# Patient Record
Sex: MALE | Race: WHITE | NOT HISPANIC OR LATINO | Employment: OTHER | URBAN - METROPOLITAN AREA
[De-identification: names, ages, dates, MRNs, and addresses within clinical notes are randomized per-mention and may not be internally consistent; named-entity substitution may affect disease eponyms.]

---

## 2017-04-10 ENCOUNTER — OFFICE VISIT (OUTPATIENT)
Dept: URGENT CARE | Facility: CLINIC | Age: 77
End: 2017-04-10
Payer: MEDICARE

## 2017-04-10 ENCOUNTER — APPOINTMENT (OUTPATIENT)
Dept: RADIOLOGY | Facility: IMAGING CENTER | Age: 77
End: 2017-04-10
Attending: NURSE PRACTITIONER
Payer: MEDICARE

## 2017-04-10 VITALS
DIASTOLIC BLOOD PRESSURE: 85 MMHG | OXYGEN SATURATION: 97 % | WEIGHT: 195 LBS | SYSTOLIC BLOOD PRESSURE: 130 MMHG | BODY MASS INDEX: 26.44 KG/M2 | RESPIRATION RATE: 20 BRPM | HEART RATE: 80 BPM | TEMPERATURE: 97.9 F

## 2017-04-10 DIAGNOSIS — R05.8 PRODUCTIVE COUGH: ICD-10-CM

## 2017-04-10 DIAGNOSIS — J22 ACUTE LOWER RESPIRATORY TRACT INFECTION: ICD-10-CM

## 2017-04-10 PROCEDURE — G8432 DEP SCR NOT DOC, RNG: HCPCS | Performed by: NURSE PRACTITIONER

## 2017-04-10 PROCEDURE — G8419 CALC BMI OUT NRM PARAM NOF/U: HCPCS | Performed by: NURSE PRACTITIONER

## 2017-04-10 PROCEDURE — 1101F PT FALLS ASSESS-DOCD LE1/YR: CPT | Mod: 8P | Performed by: NURSE PRACTITIONER

## 2017-04-10 PROCEDURE — 1036F TOBACCO NON-USER: CPT | Performed by: NURSE PRACTITIONER

## 2017-04-10 PROCEDURE — 71020 DX-CHEST-2 VIEWS: CPT | Mod: TC | Performed by: NURSE PRACTITIONER

## 2017-04-10 PROCEDURE — 99214 OFFICE O/P EST MOD 30 MIN: CPT | Performed by: NURSE PRACTITIONER

## 2017-04-10 PROCEDURE — 3017F COLORECTAL CA SCREEN DOC REV: CPT | Mod: 8P | Performed by: NURSE PRACTITIONER

## 2017-04-10 PROCEDURE — 4040F PNEUMOC VAC/ADMIN/RCVD: CPT | Mod: 8P | Performed by: NURSE PRACTITIONER

## 2017-04-10 RX ORDER — DOXYCYCLINE HYCLATE 100 MG
100 TABLET ORAL 2 TIMES DAILY
Qty: 14 TAB | Refills: 0 | Status: SHIPPED | OUTPATIENT
Start: 2017-04-10 | End: 2017-04-17

## 2017-04-10 RX ORDER — BENZONATATE 100 MG/1
100 CAPSULE ORAL 3 TIMES DAILY PRN
Qty: 60 CAP | Refills: 0 | Status: SHIPPED | OUTPATIENT
Start: 2017-04-10

## 2017-04-10 RX ORDER — CODEINE PHOSPHATE AND GUAIFENESIN 10; 100 MG/5ML; MG/5ML
5 SOLUTION ORAL
Qty: 120 ML | Refills: 0 | Status: SHIPPED | OUTPATIENT
Start: 2017-04-10

## 2017-04-10 NOTE — PROGRESS NOTES
Chief Complaint   Patient presents with   • Cough     2 wks wet cough .       HISTORY OF PRESENT ILLNESS: Patient is a 76 y.o. male who presents today due to symptoms that started two weeks ago. Pt reports a productive cough without blood in sputum. Reports associated mild sore throat, nasal congestion, and body aches. Denies sinus pressure, fever, chest pain, shortness of breath, or wheezing. Admits to h/o [facility], treated as an outpatient 6 years ago. Denies history of asthma. No immunocompromise. Has tried OTC cold medications without significant relief of symptoms. No recent ABX use. No other aggravating or alleviating factors.     Patient Active Problem List    Diagnosis Date Noted   • Mononeuritis 03/27/2014   • Bradycardia 02/20/2013   • Carotid arterial disease (CMS-HCC) 02/20/2013   • HTN (hypertension)    • Localized osteoarthrosis, lower leg        Allergies:Review of patient's allergies indicates no known allergies.    Current Outpatient Prescriptions Ordered in [facility]   Medication Sig Dispense Refill   • losartan (COZAAR) 25 MG Tab TAKE 1 TABLET BY MOUTH EVERY DAY 90 Tab 0   • duloxetine (CYMBALTA) 30 MG Cap DR Particles      • tramadol (ULTRAM) 50 MG TABS Take 100 mg by mouth every 8 hours as needed.     • clopidogrel (PLAVIX) 75 MG TABS Take 75 mg by mouth every day.       • gabapentin (NEURONTIN) 300 MG CAPS Take 300 mg by mouth. 6 tablets daily     • L-Methylfolate-B6-B12 (METANX PO) Take 40 mg by mouth 2 Times a Day.         No current Epic-ordered facility-administered medications on file.       Past Medical History   Diagnosis Date   • HTN (hypertension)    • Hypoxia    • Localized osteoarthrosis not specified whether primary or secondary, lower leg    • Carotid arterial disease (CMS-HCC) 2/20/2013   • Pain    • Unspecified hemorrhagic conditions (CMS-HCC)      complications from compartment syndrome   • Community acquired pneumonia 7/2011       Social History   Substance Use Topics   • Smoking status:  Former Smoker     Types: Cigarettes     Quit date: 02/20/1969   • Smokeless tobacco: Never Used   • Alcohol Use: Yes      Comment: one glass wine a day       No family status information on file.   History reviewed. No pertinent family history.    ROS:  Review of Systems   Constitutional: Positive for chills, fatigue. Negative for fever, weight loss and malaise.  HENT: Negative for congestion, sore throat, ear pain, nosebleeds, and neck pain.    Eyes: Negative for vision changes.   Cardiovascular: Negative for chest pain, palpitations, orthopnea and leg swelling.   Respiratory: Positive for cough and sputum production. Negative for shortness of breath and wheezing.   Gastrointestinal: Negative for abdominal pain, nausea, vomiting or diarrhea.   Skin: Negative for rash, diaphoresis.     Exam:  Blood pressure 130/85, pulse 80, temperature 36.6 °C (97.9 °F), resp. rate 20, weight 88.451 kg (195 lb), SpO2 97 %.  General: well-nourished, well-developed male in NAD  Head: normocephalic, atraumatic  Eyes: PERRLA, EOM within normal limits, no conjunctival injection, no scleral icterus, visual fields and acuity grossly intact.  Ears: normal shape and symmetry, no tenderness, no discharge. External canals are without any significant edema or erythema. Tympanic membranes are without any inflammation, no effusion. Gross auditory acuity is intact.  Nose: symmetrical without tenderness, mild discharge, erythema present bilateral nares.  Mouth/Throat: reasonable hygiene, no exudates or tonsillar enlargement. Erythema present.   Neck: no masses, range of motion within normal limits, no tracheal deviation.  Lymph: mild cervical adenopathy. No supraclavicular adenopathy.   Neuro: alert and oriented. Cranial nerves 1-12 grossly intact.   Cardiovascular: regular rate and rhythm without murmurs, rubs, or gallops. No edema.   Pulmonary: no distress. Chest is symmetrical with respiration, no wheezes, crackles, or rhonchi. Diminished right  "lower lobe.  Musculoskeletal: appropriate muscle tone, gait is stable.  Skin: warm, dry, intact, no clubbing, no cyanosis.   Psych: appropriate mood, affect, judgement.         Assessment/Plan:  1. Acute lower respiratory tract infection  doxycycline (VIBRAMYCIN) 100 MG Tab   2. Productive cough  DX-CHEST-2 VIEWS    doxycycline (VIBRAMYCIN) 100 MG Tab    guaifenesin-codeine (ROBITUSSIN AC) Solution oral solution    benzonatate (TESSALON) 100 MG Cap     DX chest radiology reading \"1.  Hypoinflation with minimal RIGHT lung base atelectasis. 2.  No pneumonia or pulmonary edema.\"        Doxycycline as directed for suspected lower respiratory tract infection. Tessalon pros and Robitussin-AC for cough, sedative effects medication discussed.  Rest, increase fluids, hand and respiratory hygiene. May take OTC Corcidin as directed for symptom relief.   Supportive care, differential diagnoses, and indications for immediate follow-up discussed with patient.   Pathogenesis of diagnosis discussed including typical length and natural progression.  Instructed to return to clinic or nearest emergency department for any change in condition, further concerns, or worsening of symptoms.  Patient states understanding of the plan of care and discharge instructions.  Instructed to make an appointment with their primary care provider in the next 3-7 days if not significantly improving and for further care.         Please note that this dictation was created using voice recognition software. I have made every reasonable attempt to correct obvious errors, but I expect that there are errors of grammar and possibly content that I did not discover before finalizing the note.      TAM Anaya.               "

## 2018-08-30 ENCOUNTER — HOSPITAL ENCOUNTER (OUTPATIENT)
Dept: HOSPITAL 8 - CFH | Age: 78
Discharge: HOME | End: 2018-08-30
Attending: INTERNAL MEDICINE
Payer: MEDICARE

## 2018-08-30 DIAGNOSIS — I10: ICD-10-CM

## 2018-08-30 DIAGNOSIS — I25.9: Primary | ICD-10-CM

## 2018-08-30 PROCEDURE — 93017 CV STRESS TEST TRACING ONLY: CPT

## 2018-08-30 PROCEDURE — 78452 HT MUSCLE IMAGE SPECT MULT: CPT

## 2018-08-30 PROCEDURE — A9502 TC99M TETROFOSMIN: HCPCS

## 2018-09-05 ENCOUNTER — HOSPITAL ENCOUNTER (OUTPATIENT)
Dept: HOSPITAL 8 - CACL | Age: 78
Setting detail: OBSERVATION
LOS: 1 days | Discharge: HOME | End: 2018-09-06
Attending: INTERNAL MEDICINE | Admitting: INTERNAL MEDICINE
Payer: MEDICARE

## 2018-09-05 VITALS — DIASTOLIC BLOOD PRESSURE: 61 MMHG | SYSTOLIC BLOOD PRESSURE: 114 MMHG

## 2018-09-05 VITALS — SYSTOLIC BLOOD PRESSURE: 120 MMHG | DIASTOLIC BLOOD PRESSURE: 65 MMHG

## 2018-09-05 VITALS — HEIGHT: 72 IN | BODY MASS INDEX: 25.02 KG/M2 | WEIGHT: 184.75 LBS

## 2018-09-05 VITALS — DIASTOLIC BLOOD PRESSURE: 74 MMHG | SYSTOLIC BLOOD PRESSURE: 146 MMHG

## 2018-09-05 DIAGNOSIS — E78.2: ICD-10-CM

## 2018-09-05 DIAGNOSIS — I25.10: Primary | ICD-10-CM

## 2018-09-05 DIAGNOSIS — Z79.02: ICD-10-CM

## 2018-09-05 DIAGNOSIS — Z95.5: ICD-10-CM

## 2018-09-05 DIAGNOSIS — I10: ICD-10-CM

## 2018-09-05 DIAGNOSIS — I73.9: ICD-10-CM

## 2018-09-05 DIAGNOSIS — E11.9: ICD-10-CM

## 2018-09-05 LAB — TROPONIN I SERPL-MCNC: < 0.015 NG/ML (ref 0–0.04)

## 2018-09-05 PROCEDURE — 99156 MOD SED OTH PHYS/QHP 5/>YRS: CPT

## 2018-09-05 PROCEDURE — C1887 CATHETER, GUIDING: HCPCS

## 2018-09-05 PROCEDURE — 84484 ASSAY OF TROPONIN QUANT: CPT

## 2018-09-05 PROCEDURE — 36415 COLL VENOUS BLD VENIPUNCTURE: CPT

## 2018-09-05 PROCEDURE — C1769 GUIDE WIRE: HCPCS

## 2018-09-05 PROCEDURE — 93454 CORONARY ARTERY ANGIO S&I: CPT

## 2018-09-05 PROCEDURE — 80048 BASIC METABOLIC PNL TOTAL CA: CPT

## 2018-09-05 PROCEDURE — 85018 HEMOGLOBIN: CPT

## 2018-09-05 PROCEDURE — 93005 ELECTROCARDIOGRAM TRACING: CPT

## 2018-09-05 PROCEDURE — C9600 PERC DRUG-EL COR STENT SING: HCPCS

## 2018-09-05 PROCEDURE — C1894 INTRO/SHEATH, NON-LASER: HCPCS

## 2018-09-05 PROCEDURE — 99157 MOD SED OTHER PHYS/QHP EA: CPT

## 2018-09-05 PROCEDURE — C1725 CATH, TRANSLUMIN NON-LASER: HCPCS

## 2018-09-05 PROCEDURE — G0378 HOSPITAL OBSERVATION PER HR: HCPCS

## 2018-09-05 PROCEDURE — 82040 ASSAY OF SERUM ALBUMIN: CPT

## 2018-09-05 PROCEDURE — C1874 STENT, COATED/COV W/DEL SYS: HCPCS

## 2018-09-06 VITALS — SYSTOLIC BLOOD PRESSURE: 102 MMHG | DIASTOLIC BLOOD PRESSURE: 62 MMHG

## 2018-09-06 VITALS — SYSTOLIC BLOOD PRESSURE: 140 MMHG | DIASTOLIC BLOOD PRESSURE: 68 MMHG

## 2018-09-06 LAB
ALBUMIN SERPL-MCNC: 3.4 G/DL (ref 3.4–5)
ANION GAP SERPL CALC-SCNC: 6 MMOL/L (ref 5–15)
CALCIUM SERPL-MCNC: 8.7 MG/DL (ref 8.5–10.1)
CHLORIDE SERPL-SCNC: 105 MMOL/L (ref 98–107)
CREAT SERPL-MCNC: 0.89 MG/DL (ref 0.7–1.3)

## 2019-06-20 ENCOUNTER — HOSPITAL ENCOUNTER (OUTPATIENT)
Dept: HOSPITAL 8 - CVU | Age: 79
Discharge: HOME | End: 2019-06-20
Attending: SURGERY
Payer: MEDICARE

## 2019-06-20 DIAGNOSIS — E11.9: ICD-10-CM

## 2019-06-20 DIAGNOSIS — E78.5: ICD-10-CM

## 2019-06-20 DIAGNOSIS — I10: ICD-10-CM

## 2019-06-20 DIAGNOSIS — I70.213: Primary | ICD-10-CM

## 2019-06-20 DIAGNOSIS — I25.10: ICD-10-CM

## 2019-06-20 PROCEDURE — 93925 LOWER EXTREMITY STUDY: CPT

## 2019-06-20 PROCEDURE — 93922 UPR/L XTREMITY ART 2 LEVELS: CPT

## 2021-01-14 DIAGNOSIS — Z23 NEED FOR VACCINATION: ICD-10-CM

## 2024-10-18 ENCOUNTER — APPOINTMENT (OUTPATIENT)
Dept: RADIOLOGY | Facility: MEDICAL CENTER | Age: 84
End: 2024-10-18
Attending: EMERGENCY MEDICINE
Payer: MEDICARE

## 2024-10-18 ENCOUNTER — HOSPITAL ENCOUNTER (EMERGENCY)
Facility: MEDICAL CENTER | Age: 84
End: 2024-10-18
Attending: EMERGENCY MEDICINE
Payer: MEDICARE

## 2024-10-18 VITALS
SYSTOLIC BLOOD PRESSURE: 149 MMHG | DIASTOLIC BLOOD PRESSURE: 73 MMHG | TEMPERATURE: 97.8 F | HEART RATE: 65 BPM | RESPIRATION RATE: 20 BRPM | OXYGEN SATURATION: 92 % | WEIGHT: 214.29 LBS | BODY MASS INDEX: 29.02 KG/M2 | HEIGHT: 72 IN

## 2024-10-18 DIAGNOSIS — S22.31XA CLOSED FRACTURE OF ONE RIB OF RIGHT SIDE, INITIAL ENCOUNTER: ICD-10-CM
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
## 2024-10-18 PROCEDURE — 99284 EMERGENCY DEPT VISIT MOD MDM: CPT | Mod: 25

## 2024-10-18 PROCEDURE — 70450 CT HEAD/BRAIN W/O DYE: CPT

## 2024-10-18 PROCEDURE — 71101 X-RAY EXAM UNILAT RIBS/CHEST: CPT | Mod: RT

## 2024-10-18 RX ORDER — MORPHINE SULFATE 15 MG/1
15 TABLET ORAL EVERY 6 HOURS PRN
Qty: 9 TABLET | Refills: 0 | Status: SHIPPED | OUTPATIENT
Start: 2024-10-18 | End: 2024-10-21

## 2024-10-18 ASSESSMENT — PAIN DESCRIPTION - PAIN TYPE: TYPE: ACUTE PAIN
